# Patient Record
Sex: FEMALE | URBAN - NONMETROPOLITAN AREA
[De-identification: names, ages, dates, MRNs, and addresses within clinical notes are randomized per-mention and may not be internally consistent; named-entity substitution may affect disease eponyms.]

---

## 2018-03-19 ENCOUNTER — APPOINTMENT (OUTPATIENT)
Age: 73
Setting detail: DERMATOLOGY
End: 2018-03-19

## 2018-03-19 DIAGNOSIS — L21.8 OTHER SEBORRHEIC DERMATITIS: ICD-10-CM

## 2018-03-19 DIAGNOSIS — L57.0 ACTINIC KERATOSIS: ICD-10-CM

## 2018-03-19 DIAGNOSIS — L64.8 OTHER ANDROGENIC ALOPECIA: ICD-10-CM

## 2018-03-19 PROBLEM — D48.5 NEOPLASM OF UNCERTAIN BEHAVIOR OF SKIN: Status: ACTIVE | Noted: 2018-03-19

## 2018-03-19 PROCEDURE — 11100: CPT

## 2018-03-19 PROCEDURE — OTHER BIOPSY BY PUNCH METHOD: OTHER

## 2018-03-19 PROCEDURE — OTHER PRESCRIPTION: OTHER

## 2018-03-19 PROCEDURE — OTHER COUNSELING: OTHER

## 2018-03-19 PROCEDURE — 99202 OFFICE O/P NEW SF 15 MIN: CPT | Mod: 25

## 2018-03-19 PROCEDURE — OTHER MIPS QUALITY: OTHER

## 2018-03-19 RX ORDER — CLOBETASOL PROPIONATE 0.5 MG/ML
SOLUTION TOPICAL
Qty: 1 | Refills: 1 | Status: ERX | COMMUNITY
Start: 2018-03-19

## 2018-03-19 RX ORDER — KETOCONAZOLE 20.5 MG/ML
SHAMPOO, SUSPENSION TOPICAL QD
Qty: 1 | Refills: 2 | Status: ERX | COMMUNITY
Start: 2018-03-19

## 2018-03-19 ASSESSMENT — LOCATION ZONE DERM: LOCATION ZONE: HAND

## 2018-03-19 ASSESSMENT — LOCATION DETAILED DESCRIPTION DERM: LOCATION DETAILED: RIGHT RADIAL DORSAL HAND

## 2018-03-19 ASSESSMENT — LOCATION SIMPLE DESCRIPTION DERM: LOCATION SIMPLE: RIGHT HAND

## 2018-03-19 NOTE — PROCEDURE: BIOPSY BY PUNCH METHOD
Bill For Surgical Tray: no
Detail Level: Detailed
Additional Anesthesia Volume In Cc (Will Not Render If 0): 0
Anesthesia Volume In Cc (Will Not Render If 0): 0.5
Notification Instructions: Patient will be notified of biopsy results. However, patient instructed to call the office if not contacted within 2 weeks.
Wound Care: Bacitracin
Post-Care Instructions: I reviewed with the patient in detail post-care instructions. Patient is to keep the biopsy site dry overnight, and then apply bacitracin twice daily until healed. Patient may apply hydrogen peroxide soaks to remove any crusting.
Dressing: bandage
Anesthesia Type: 1% lidocaine with 1:100,000 epinephrine
Punch Size In Mm: 3
Billing Type: Third-Party Bill
Suture Removal: 14 days
Biopsy Type: H and E
Consent: Written consent was obtained and risks were reviewed including but not limited to scarring, infection, bleeding, scabbing, incomplete removal, nerve damage and allergy to anesthesia.
Home Suture Removal Text: Patient was provided a home suture removal kit and will remove their sutures at home.  If they have any questions or difficulties they will call the office.
Hemostasis: None
Epidermal Sutures: 4-0 Nylon

## 2018-04-02 ENCOUNTER — APPOINTMENT (OUTPATIENT)
Age: 73
Setting detail: DERMATOLOGY
End: 2018-04-02

## 2018-04-02 PROBLEM — C44.622 SQUAMOUS CELL CARCINOMA OF SKIN OF RIGHT UPPER LIMB, INCLUDING SHOULDER: Status: ACTIVE | Noted: 2018-04-02

## 2018-04-02 PROCEDURE — 17311 MOHS 1 STAGE H/N/HF/G: CPT

## 2018-04-02 PROCEDURE — OTHER MOHS SURGERY: OTHER

## 2018-04-03 ENCOUNTER — APPOINTMENT (OUTPATIENT)
Age: 73
Setting detail: DERMATOLOGY
End: 2018-04-03

## 2018-04-03 PROBLEM — C44.622 SQUAMOUS CELL CARCINOMA OF SKIN OF RIGHT UPPER LIMB, INCLUDING SHOULDER: Status: ACTIVE | Noted: 2018-04-03

## 2018-04-03 PROCEDURE — OTHER REPAIR NOTE: OTHER

## 2018-04-03 PROCEDURE — 13132 CMPLX RPR F/C/C/M/N/AX/G/H/F: CPT

## 2018-04-10 ENCOUNTER — APPOINTMENT (OUTPATIENT)
Age: 73
Setting detail: DERMATOLOGY
End: 2018-04-10

## 2018-04-10 DIAGNOSIS — Z48.02 ENCOUNTER FOR REMOVAL OF SUTURES: ICD-10-CM

## 2018-04-10 PROCEDURE — 99024 POSTOP FOLLOW-UP VISIT: CPT

## 2018-04-10 PROCEDURE — OTHER SUTURE REMOVAL (GLOBAL PERIOD): OTHER

## 2018-04-10 ASSESSMENT — LOCATION ZONE DERM: LOCATION ZONE: HAND

## 2018-04-10 ASSESSMENT — LOCATION SIMPLE DESCRIPTION DERM: LOCATION SIMPLE: RIGHT HAND

## 2018-04-10 ASSESSMENT — LOCATION DETAILED DESCRIPTION DERM: LOCATION DETAILED: RIGHT RADIAL DORSAL HAND

## 2018-04-10 NOTE — PROCEDURE: SUTURE REMOVAL (GLOBAL PERIOD)
Add 39988 Cpt? (Important Note: In 2017 The Use Of 95482 Is Being Tracked By Cms To Determine Future Global Period Reimbursement For Global Periods): yes
Detail Level: Detailed

## 2018-07-09 ENCOUNTER — APPOINTMENT (OUTPATIENT)
Age: 73
Setting detail: DERMATOLOGY
End: 2018-07-09

## 2018-07-09 DIAGNOSIS — L40.8 OTHER PSORIASIS: ICD-10-CM

## 2018-07-09 PROCEDURE — OTHER PRESCRIPTION: OTHER

## 2018-07-09 PROCEDURE — 99213 OFFICE O/P EST LOW 20 MIN: CPT

## 2018-07-09 PROCEDURE — OTHER COUNSELING: OTHER

## 2018-07-09 RX ORDER — HYDROXYZINE HYDROCHLORIDE 25 MG/1
25MG TABLET, FILM COATED ORAL BID
Qty: 60 | Refills: 1 | Status: ERX | COMMUNITY
Start: 2018-07-09

## 2018-07-09 RX ORDER — TRIAMCINOLONE ACETONIDE 1 MG/G
CREAM TOPICAL BID
Qty: 1 | Refills: 1 | Status: ERX | COMMUNITY
Start: 2018-07-09

## 2018-07-09 RX ORDER — CALCIPOTRIENE 50 UG/G
CREAM TOPICAL BID
Qty: 1 | Refills: 1 | Status: ERX | COMMUNITY
Start: 2018-07-09

## 2018-07-09 ASSESSMENT — LOCATION DETAILED DESCRIPTION DERM
LOCATION DETAILED: RIGHT ANTERIOR MEDIAL PROXIMAL UPPER ARM
LOCATION DETAILED: LEFT LATERAL ABDOMEN
LOCATION DETAILED: RIGHT LATERAL ABDOMEN
LOCATION DETAILED: LEFT AXILLARY TAIL OF BREAST

## 2018-07-09 ASSESSMENT — LOCATION ZONE DERM
LOCATION ZONE: ARM
LOCATION ZONE: TRUNK

## 2018-07-09 ASSESSMENT — LOCATION SIMPLE DESCRIPTION DERM
LOCATION SIMPLE: ABDOMEN
LOCATION SIMPLE: RIGHT UPPER ARM
LOCATION SIMPLE: LEFT BREAST

## 2019-10-02 NOTE — PROCEDURE: REPAIR NOTE
Additional Anesthesia Volume In Cc (Will Not Render If 0): 0 Referred To Otolaryngology For Closure Text (Leave Blank If You Do Not Want): After obtaining clear surgical margins the patient was sent to otolaryngology for surgical repair.  The patient understands they will receive post-surgical care and follow-up from the referring physician's office.

## 2020-06-15 ENCOUNTER — APPOINTMENT (OUTPATIENT)
Age: 75
Setting detail: DERMATOLOGY
End: 2020-06-25

## 2020-06-15 DIAGNOSIS — L57.0 ACTINIC KERATOSIS: ICD-10-CM

## 2020-06-15 DIAGNOSIS — L81.4 OTHER MELANIN HYPERPIGMENTATION: ICD-10-CM

## 2020-06-15 DIAGNOSIS — G89.3 NEOPLASM RELATED PAIN (ACUTE) (CHRONIC): ICD-10-CM

## 2020-06-15 PROBLEM — D48.5 NEOPLASM OF UNCERTAIN BEHAVIOR OF SKIN: Status: ACTIVE | Noted: 2020-06-15

## 2020-06-15 PROCEDURE — OTHER BIOPSY BY SHAVE METHOD: OTHER

## 2020-06-15 PROCEDURE — OTHER MIPS QUALITY: OTHER

## 2020-06-15 PROCEDURE — OTHER REASSURANCE: OTHER

## 2020-06-15 PROCEDURE — 99213 OFFICE O/P EST LOW 20 MIN: CPT | Mod: 25

## 2020-06-15 PROCEDURE — OTHER PRESCRIPTION: OTHER

## 2020-06-15 PROCEDURE — OTHER COUNSELING: OTHER

## 2020-06-15 PROCEDURE — 69100 BIOPSY OF EXTERNAL EAR: CPT

## 2020-06-15 RX ORDER — MUPIROCIN 20 MG/G
2% OINTMENT TOPICAL BID
Qty: 1 | Refills: 0 | Status: ERX | COMMUNITY
Start: 2020-06-15

## 2020-06-15 ASSESSMENT — LOCATION ZONE DERM
LOCATION ZONE: ARM
LOCATION ZONE: EAR

## 2020-06-15 ASSESSMENT — LOCATION DETAILED DESCRIPTION DERM
LOCATION DETAILED: LEFT DISTAL DORSAL FOREARM
LOCATION DETAILED: LEFT SUPERIOR HELIX
LOCATION DETAILED: LEFT TRIANGULAR FOSSA
LOCATION DETAILED: RIGHT PROXIMAL DORSAL FOREARM

## 2020-06-15 ASSESSMENT — LOCATION SIMPLE DESCRIPTION DERM
LOCATION SIMPLE: LEFT EAR
LOCATION SIMPLE: LEFT FOREARM
LOCATION SIMPLE: RIGHT FOREARM

## 2020-06-15 NOTE — PROCEDURE: BIOPSY BY SHAVE METHOD
Type Of Destruction Used: Curettage
Hide Additional Anticipated Plan?: No
Wound Care: Petrolatum
Information: Selecting Yes will display possible errors in your note based on the variables you have selected. This validation is only offered as a suggestion for you. PLEASE NOTE THAT THE VALIDATION TEXT WILL BE REMOVED WHEN YOU FINALIZE YOUR NOTE. IF YOU WANT TO FAX A PRELIMINARY NOTE YOU WILL NEED TO TOGGLE THIS TO 'NO' IF YOU DO NOT WANT IT IN YOUR FAXED NOTE.
Was A Bandage Applied: Yes
Electrodesiccation Text: The wound bed was treated with electrodesiccation after the biopsy was performed.
X Size Of Lesion In Cm: 0
Billing Type: Third-Party Bill
Cryotherapy Text: The wound bed was treated with cryotherapy after the biopsy was performed.
Post-Care Instructions: I reviewed with the patient in detail post-care instructions. Patient is to keep the biopsy site dry overnight, and then apply bacitracin twice daily until healed. Patient may apply hydrogen peroxide soaks to remove any crusting.
Curettage Text: The wound bed was treated with curettage after the biopsy was performed.
Detail Level: Detailed
Silver Nitrate Text: The wound bed was treated with silver nitrate after the biopsy was performed.
Biopsy Method: Dermablade
Electrodesiccation And Curettage Text: The wound bed was treated with electrodesiccation and curettage after the biopsy was performed.
Biopsy Type: H and E
Depth Of Biopsy: dermis
Dressing: bandage
Consent: Written consent was obtained and risks were reviewed including but not limited to scarring, infection, bleeding, scabbing, incomplete removal, nerve damage and allergy to anesthesia.
Notification Instructions: Patient will be notified of biopsy results. However, patient instructed to call the office if not contacted within 2 weeks.
Anesthesia Volume In Cc (Will Not Render If 0): 0.5
Hemostasis: Pressure
Anesthesia Type: 1% lidocaine with epinephrine

## 2020-07-02 ENCOUNTER — APPOINTMENT (OUTPATIENT)
Age: 75
Setting detail: DERMATOLOGY
End: 2020-07-06

## 2020-07-02 DIAGNOSIS — Z48.817 ENCOUNTER FOR SURGICAL AFTERCARE FOLLOWING SURGERY ON THE SKIN AND SUBCUTANEOUS TISSUE: ICD-10-CM

## 2020-07-02 DIAGNOSIS — L0391 CELLULITIS AND ABSCESS OF UNSPECIFIED SITES: ICD-10-CM

## 2020-07-02 DIAGNOSIS — L0390 CELLULITIS AND ABSCESS OF UNSPECIFIED SITES: ICD-10-CM

## 2020-07-02 PROBLEM — H60.12 CELLULITIS OF LEFT EXTERNAL EAR: Status: ACTIVE | Noted: 2020-07-02

## 2020-07-02 PROCEDURE — OTHER PRESCRIPTION: OTHER

## 2020-07-02 PROCEDURE — 99024 POSTOP FOLLOW-UP VISIT: CPT

## 2020-07-02 PROCEDURE — 99213 OFFICE O/P EST LOW 20 MIN: CPT

## 2020-07-02 PROCEDURE — OTHER POST-OP WOUND CHECK: OTHER

## 2020-07-02 PROCEDURE — OTHER COUNSELING: OTHER

## 2020-07-02 RX ORDER — CEPHALEXIN 500 MG/1
500 CAPSULE ORAL BID
Qty: 20 | Refills: 0 | Status: ERX | COMMUNITY
Start: 2020-07-02

## 2020-07-02 ASSESSMENT — LOCATION SIMPLE DESCRIPTION DERM: LOCATION SIMPLE: LEFT EAR

## 2020-07-02 ASSESSMENT — LOCATION DETAILED DESCRIPTION DERM
LOCATION DETAILED: LEFT SUPERIOR HELIX
LOCATION DETAILED: LEFT SUPERIOR CRUS OF ANTIHELIX

## 2020-07-02 ASSESSMENT — LOCATION ZONE DERM: LOCATION ZONE: EAR

## 2020-07-02 NOTE — PROCEDURE: POST-OP WOUND CHECK
Detail Level: Detailed
Add 73353 Cpt? (Important Note: In 2017 The Use Of 71023 Is Being Tracked By Cms To Determine Future Global Period Reimbursement For Global Periods): yes

## 2020-07-16 ENCOUNTER — APPOINTMENT (OUTPATIENT)
Age: 75
Setting detail: DERMATOLOGY
End: 2020-07-17

## 2020-07-16 DIAGNOSIS — L98429 CHRONIC ULCER OF OTHER SPECIFIED SITES: ICD-10-CM

## 2020-07-16 DIAGNOSIS — L0391 CELLULITIS AND ABSCESS OF UNSPECIFIED SITES: ICD-10-CM

## 2020-07-16 DIAGNOSIS — L98419 CHRONIC ULCER OF OTHER SPECIFIED SITES: ICD-10-CM

## 2020-07-16 DIAGNOSIS — L0390 CELLULITIS AND ABSCESS OF UNSPECIFIED SITES: ICD-10-CM

## 2020-07-16 PROBLEM — H60.12 CELLULITIS OF LEFT EXTERNAL EAR: Status: ACTIVE | Noted: 2020-07-16

## 2020-07-16 PROBLEM — L98.499 NON-PRESSURE CHRONIC ULCER OF SKIN OF OTHER SITES WITH UNSPECIFIED SEVERITY: Status: ACTIVE | Noted: 2020-07-16

## 2020-07-16 PROCEDURE — OTHER PRESCRIPTION: OTHER

## 2020-07-16 PROCEDURE — 99213 OFFICE O/P EST LOW 20 MIN: CPT

## 2020-07-16 PROCEDURE — OTHER COUNSELING: OTHER

## 2020-07-16 RX ORDER — CLINDAMYCIN HYDROCHLORIDE 300 MG/1
CAPSULE ORAL
Qty: 30 | Refills: 0 | Status: ERX | COMMUNITY
Start: 2020-07-16

## 2020-07-16 RX ORDER — CLOBETASOL PROPIONATE 0.5 MG/G
OINTMENT TOPICAL
Qty: 1 | Refills: 0 | Status: ERX | COMMUNITY
Start: 2020-07-16

## 2020-07-16 ASSESSMENT — LOCATION DETAILED DESCRIPTION DERM
LOCATION DETAILED: LEFT SUPERIOR HELIX
LOCATION DETAILED: LEFT SUPERIOR CRUS OF ANTIHELIX

## 2020-07-16 ASSESSMENT — LOCATION ZONE DERM: LOCATION ZONE: EAR

## 2020-07-16 ASSESSMENT — LOCATION SIMPLE DESCRIPTION DERM: LOCATION SIMPLE: LEFT EAR

## 2022-08-19 NOTE — PROCEDURE: REPAIR NOTE
Products Recommended: Sun Protective Clothing.    Full spectrum. Sunscreen General Sunscreen Counseling: I recommended a broad spectrum sunscreen with a SPF of 30 or higher.  I explained that SPF 30 sunscreens block approximately 97 percent of the sun's harmful rays.  Sunscreens should be applied at least 15 minutes prior to expected sun exposure and then every 2 hours after that as long as sun exposure continues. If swimming or exercising sunscreen should be reapplied every 45 minutes to an hour after getting wet or sweating.  One ounce, or the equivalent of a shot glass full of sunscreen, is adequate to protect the skin not covered by a bathing suit. I also recommended a lip balm with a sunscreen as well. Sun protective clothing can be used in lieu of sunscreen but must be worn the entire time you are exposed to the sun's rays. Detail Level: Generalized V-Y Plasty Text: The defect edges were debeveled with a #15 scalpel blade.  Given the location of the defect, shape of the defect and the proximity to free margins an V-Y advancement flap was deemed most appropriate.  Using a sterile surgical marker, an appropriate advancement flap was drawn incorporating the defect and placing the expected incisions within the relaxed skin tension lines where possible.    The area thus outlined was incised deep to adipose tissue with a #15 scalpel blade.  The skin margins were undermined to an appropriate distance in all directions utilizing iris scissors.

## 2023-08-21 ENCOUNTER — APPOINTMENT (OUTPATIENT)
Dept: URBAN - NONMETROPOLITAN AREA SURGERY 9 | Age: 78
Setting detail: DERMATOLOGY
End: 2023-08-21

## 2023-08-21 DIAGNOSIS — L82.1 OTHER SEBORRHEIC KERATOSIS: ICD-10-CM

## 2023-08-21 DIAGNOSIS — D49.2 NEOPLASM OF UNSPECIFIED BEHAVIOR OF BONE, SOFT TISSUE, AND SKIN: ICD-10-CM

## 2023-08-21 PROCEDURE — OTHER BIOPSY BY SHAVE METHOD: OTHER

## 2023-08-21 PROCEDURE — 11102 TANGNTL BX SKIN SINGLE LES: CPT

## 2023-08-21 PROCEDURE — 99212 OFFICE O/P EST SF 10 MIN: CPT | Mod: 25

## 2023-08-21 PROCEDURE — OTHER COUNSELING: OTHER

## 2023-08-21 PROCEDURE — OTHER REASSURANCE: OTHER

## 2023-08-21 ASSESSMENT — LOCATION SIMPLE DESCRIPTION DERM
LOCATION SIMPLE: RIGHT UPPER BACK
LOCATION SIMPLE: LEFT UPPER BACK
LOCATION SIMPLE: LEFT LOWER BACK

## 2023-08-21 ASSESSMENT — LOCATION ZONE DERM: LOCATION ZONE: TRUNK

## 2023-08-21 ASSESSMENT — LOCATION DETAILED DESCRIPTION DERM
LOCATION DETAILED: RIGHT SUPERIOR UPPER BACK
LOCATION DETAILED: LEFT SUPERIOR MEDIAL MIDBACK
LOCATION DETAILED: LEFT MID-UPPER BACK
LOCATION DETAILED: RIGHT INFERIOR UPPER BACK

## 2023-09-20 ENCOUNTER — APPOINTMENT (OUTPATIENT)
Dept: URBAN - NONMETROPOLITAN AREA SURGERY 9 | Age: 78
Setting detail: DERMATOLOGY
End: 2023-09-21

## 2023-09-20 DIAGNOSIS — L82.1 OTHER SEBORRHEIC KERATOSIS: ICD-10-CM

## 2023-09-20 DIAGNOSIS — Z48.817 ENCOUNTER FOR SURGICAL AFTERCARE FOLLOWING SURGERY ON THE SKIN AND SUBCUTANEOUS TISSUE: ICD-10-CM

## 2023-09-20 PROBLEM — C44.519 BASAL CELL CARCINOMA OF SKIN OF OTHER PART OF TRUNK: Status: ACTIVE | Noted: 2023-09-20

## 2023-09-20 PROCEDURE — OTHER PATHOLOGY DISCUSSION: OTHER

## 2023-09-20 PROCEDURE — OTHER CONSULTATION FOR MOHS SURGERY: OTHER

## 2023-09-20 PROCEDURE — 99024 POSTOP FOLLOW-UP VISIT: CPT

## 2023-09-20 PROCEDURE — OTHER POST-OP WOUND CHECK: OTHER

## 2023-09-20 PROCEDURE — OTHER COUNSELING: OTHER

## 2023-09-20 PROCEDURE — OTHER MIPS QUALITY: OTHER

## 2023-09-20 PROCEDURE — OTHER REASSURANCE: OTHER

## 2023-09-20 ASSESSMENT — LOCATION ZONE DERM: LOCATION ZONE: TRUNK

## 2023-09-20 ASSESSMENT — LOCATION DETAILED DESCRIPTION DERM
LOCATION DETAILED: RIGHT INFERIOR UPPER BACK
LOCATION DETAILED: LEFT MID-UPPER BACK
LOCATION DETAILED: RIGHT SUPERIOR UPPER BACK
LOCATION DETAILED: LEFT SUPERIOR MEDIAL MIDBACK

## 2023-09-20 ASSESSMENT — LOCATION SIMPLE DESCRIPTION DERM
LOCATION SIMPLE: LEFT LOWER BACK
LOCATION SIMPLE: LEFT UPPER BACK
LOCATION SIMPLE: RIGHT UPPER BACK

## 2023-09-20 NOTE — PROCEDURE: POST-OP WOUND CHECK
Detail Level: Detailed
Add 39958 Cpt? (Important Note: In 2017 The Use Of 01020 Is Being Tracked By Cms To Determine Future Global Period Reimbursement For Global Periods): yes

## 2023-09-26 ENCOUNTER — APPOINTMENT (OUTPATIENT)
Dept: URBAN - NONMETROPOLITAN AREA SURGERY 9 | Age: 78
Setting detail: DERMATOLOGY
End: 2023-09-26

## 2023-09-26 PROBLEM — C44.519 BASAL CELL CARCINOMA OF SKIN OF OTHER PART OF TRUNK: Status: ACTIVE | Noted: 2023-09-26

## 2023-09-26 PROCEDURE — 17313 MOHS 1 STAGE T/A/L: CPT

## 2023-09-26 PROCEDURE — OTHER PRESCRIPTION: OTHER

## 2023-09-26 PROCEDURE — OTHER COUNSELING: OTHER

## 2023-09-26 PROCEDURE — OTHER MOHS SURGERY: OTHER

## 2023-09-26 PROCEDURE — 13101 CMPLX RPR TRUNK 2.6-7.5 CM: CPT

## 2023-09-26 PROCEDURE — OTHER REPAIR NOTE: OTHER

## 2023-09-26 RX ORDER — MUPIROCIN 20 MG/G
OINTMENT TOPICAL BID
Qty: 22 | Refills: 0 | Status: ERX | COMMUNITY
Start: 2023-09-26

## 2023-09-26 RX ORDER — CEPHALEXIN 500 MG/1
CAPSULE ORAL BID
Qty: 20 | Refills: 0 | Status: ERX | COMMUNITY
Start: 2023-09-26

## 2023-10-09 ENCOUNTER — APPOINTMENT (OUTPATIENT)
Dept: URBAN - NONMETROPOLITAN AREA SURGERY 9 | Age: 78
Setting detail: DERMATOLOGY
End: 2023-10-10

## 2023-10-09 DIAGNOSIS — Z48.02 ENCOUNTER FOR REMOVAL OF SUTURES: ICD-10-CM

## 2023-10-09 DIAGNOSIS — Z85.828 PERSONAL HISTORY OF OTHER MALIGNANT NEOPLASM OF SKIN: ICD-10-CM

## 2023-10-09 PROCEDURE — 99024 POSTOP FOLLOW-UP VISIT: CPT

## 2023-10-09 PROCEDURE — OTHER SUTURE REMOVAL (GLOBAL PERIOD): OTHER

## 2023-10-09 PROCEDURE — OTHER COUNSELING: OTHER

## 2023-10-09 ASSESSMENT — LOCATION ZONE DERM: LOCATION ZONE: TRUNK

## 2023-10-09 ASSESSMENT — LOCATION DETAILED DESCRIPTION DERM
LOCATION DETAILED: RIGHT SUPERIOR UPPER BACK
LOCATION DETAILED: RIGHT SUPERIOR MEDIAL UPPER BACK

## 2023-10-09 ASSESSMENT — LOCATION SIMPLE DESCRIPTION DERM: LOCATION SIMPLE: RIGHT UPPER BACK

## 2023-10-09 NOTE — PROCEDURE: SUTURE REMOVAL (GLOBAL PERIOD)
Detail Level: Detailed
Add 43139 Cpt? (Important Note: In 2017 The Use Of 76419 Is Being Tracked By Cms To Determine Future Global Period Reimbursement For Global Periods): yes

## 2023-11-30 ENCOUNTER — APPOINTMENT (OUTPATIENT)
Dept: URBAN - NONMETROPOLITAN AREA SURGERY 9 | Age: 78
Setting detail: DERMATOLOGY
End: 2023-11-30

## 2023-11-30 DIAGNOSIS — D49.2 NEOPLASM OF UNSPECIFIED BEHAVIOR OF BONE, SOFT TISSUE, AND SKIN: ICD-10-CM

## 2023-11-30 DIAGNOSIS — Z85.828 PERSONAL HISTORY OF OTHER MALIGNANT NEOPLASM OF SKIN: ICD-10-CM

## 2023-11-30 PROCEDURE — OTHER MIPS QUALITY: OTHER

## 2023-11-30 PROCEDURE — 69100 BIOPSY OF EXTERNAL EAR: CPT | Mod: 76

## 2023-11-30 PROCEDURE — OTHER BIOPSY BY SHAVE METHOD: OTHER

## 2023-11-30 PROCEDURE — OTHER COUNSELING: OTHER

## 2023-11-30 PROCEDURE — 99212 OFFICE O/P EST SF 10 MIN: CPT | Mod: 25

## 2023-11-30 PROCEDURE — 69100 BIOPSY OF EXTERNAL EAR: CPT

## 2023-11-30 ASSESSMENT — LOCATION ZONE DERM
LOCATION ZONE: EAR
LOCATION ZONE: TRUNK

## 2023-11-30 ASSESSMENT — LOCATION DETAILED DESCRIPTION DERM
LOCATION DETAILED: RIGHT MID-UPPER BACK
LOCATION DETAILED: RIGHT SUPERIOR HELIX

## 2023-11-30 ASSESSMENT — LOCATION SIMPLE DESCRIPTION DERM
LOCATION SIMPLE: RIGHT EAR
LOCATION SIMPLE: RIGHT UPPER BACK

## 2023-11-30 NOTE — PROCEDURE: BIOPSY BY SHAVE METHOD
Detail Level: Detailed
Depth Of Biopsy: dermis
Was A Bandage Applied: Yes
Size Of Lesion In Cm: 0
Biopsy Type: H and E
Biopsy Method: Dermablade
Anesthesia Type: 1% lidocaine with epinephrine
Anesthesia Volume In Cc: 0.5
Hemostasis: Drysol
Wound Care: Petrolatum
Dressing: bandage
Destruction After The Procedure: No
Type Of Destruction Used: Curettage
Curettage Text: The wound bed was treated with curettage after the biopsy was performed.
Cryotherapy Text: The wound bed was treated with cryotherapy after the biopsy was performed.
Electrodesiccation Text: The wound bed was treated with electrodesiccation after the biopsy was performed.
Electrodesiccation And Curettage Text: The wound bed was treated with electrodesiccation and curettage after the biopsy was performed.
Silver Nitrate Text: The wound bed was treated with silver nitrate after the biopsy was performed.
Lab: [id number]
Consent: Written consent was obtained and risks were reviewed including but not limited to scarring, infection, bleeding, scabbing, incomplete removal, nerve damage and allergy to anesthesia.
Post-Care Instructions: I reviewed with the patient in detail post-care instructions. Patient is to keep the biopsy site dry overnight, and then apply bacitracin twice daily until healed. Patient may apply hydrogen peroxide soaks to remove any crusting.
Notification Instructions: Patient will be notified of biopsy results. However, patient instructed to call the office if not contacted within 2 weeks.
Billing Type: Third-Party Bill
Information: Selecting Yes will display possible errors in your note based on the variables you have selected. This validation is only offered as a suggestion for you. PLEASE NOTE THAT THE VALIDATION TEXT WILL BE REMOVED WHEN YOU FINALIZE YOUR NOTE. IF YOU WANT TO FAX A PRELIMINARY NOTE YOU WILL NEED TO TOGGLE THIS TO 'NO' IF YOU DO NOT WANT IT IN YOUR FAXED NOTE.

## 2024-02-14 ENCOUNTER — APPOINTMENT (OUTPATIENT)
Dept: URBAN - NONMETROPOLITAN AREA SURGERY 9 | Age: 79
Setting detail: DERMATOLOGY
End: 2024-02-14

## 2024-02-14 DIAGNOSIS — L57.0 ACTINIC KERATOSIS: ICD-10-CM

## 2024-02-14 DIAGNOSIS — Z85.828 PERSONAL HISTORY OF OTHER MALIGNANT NEOPLASM OF SKIN: ICD-10-CM

## 2024-02-14 PROCEDURE — 99212 OFFICE O/P EST SF 10 MIN: CPT | Mod: 25

## 2024-02-14 PROCEDURE — OTHER LIQUID NITROGEN: OTHER

## 2024-02-14 PROCEDURE — 17000 DESTRUCT PREMALG LESION: CPT

## 2024-02-14 PROCEDURE — OTHER COUNSELING: OTHER

## 2024-02-14 ASSESSMENT — LOCATION SIMPLE DESCRIPTION DERM
LOCATION SIMPLE: RIGHT EAR
LOCATION SIMPLE: RIGHT UPPER BACK

## 2024-02-14 ASSESSMENT — LOCATION DETAILED DESCRIPTION DERM
LOCATION DETAILED: RIGHT SUPERIOR HELIX
LOCATION DETAILED: RIGHT MID-UPPER BACK

## 2024-02-14 ASSESSMENT — LOCATION ZONE DERM
LOCATION ZONE: EAR
LOCATION ZONE: TRUNK

## 2025-02-18 NOTE — PROCEDURE: REPAIR NOTE
Routine Prenatal Visit  [facility] [place]  [address], [place] [address]  Assessment & Plan  Abnormal glucose tolerance test (GTT) during pregnancy, antepartum  - Completed home BG monitoring, did not meet criteria for GDM       Prenatal care, subsequent pregnancy, third trimester         37 weeks gestation of pregnancy  - Labs up to date   - Genetic testing deferred   - Unremarkable level II US   - Vaccinations: declined previously   - GBS negative   - Desires SVE today, 1.5-50/-3; fetus vertex on US (patient desired a check)   - RTC for 38 week visit     Orders:    POCT urine dip    Other obesity due to excess calories affecting pregnancy in first trimester  - BMI 33        Subjective:   Greer Lu is a 32 y.o.  who presents for routine prenatal care at 37w5d.  Complaints today: reports irregular contractions     LOF: no; VB: no; Contractions: irregular; FM: yes     Objective:  BP 96/60   Wt 87.8 kg (193 lb 9.6 oz)   LMP 2024 (Exact Date)   BMI 33.23 kg/m²     General: Well appearing, no distress  Respiratory: Unlabored breathing  Cardiovascular: Regular rate.  Abdomen: Soft, gravid, nontender  Extremities: Warm and well perfused.  Non tender.    Pregravid Weight/BMI: 78 kg (172 lb) (BMI 29.51)  Current Weight: 87.8 kg (193 lb 9.6 oz)   Total Weight Gain: 9.798 kg (21 lb 9.6 oz)     Pre-Renuka Vitals      Flowsheet Row Most Recent Value   Prenatal Assessment    Fetal Heart Rate 145   Fundal Height (cm) 37 cm   Movement Present   Prenatal Vitals    Blood Pressure 96/60   Weight - Scale 87.8 kg (193 lb 9.6 oz)   Urine Albumin/Glucose    Dilation/Effacement/Station    Vaginal Drainage    Edema            Leobardo Castillo MD  2025 9:38 AM     Composite Graft Text: The defect edges were debeveled with a #15 scalpel blade.  Given the location of the defect, shape of the defect, the proximity to free margins and the fact the defect was full thickness a composite graft was deemed most appropriate.  The defect was outline and then transferred to the donor site.  A full thickness graft was then excised from the donor site. The graft was then placed in the primary defect, oriented appropriately and then sutured into place.  The secondary defect was then repaired using a primary closure.

## 2025-03-13 NOTE — PROCEDURE: MOHS SURGERY
What Type Of Note Output Would You Prefer (Optional)?: Bullet Format How Severe Is Your Skin Lesion?: mild Has Your Skin Lesion Been Treated?: not been treated Is This A New Presentation, Or A Follow-Up?: Skin Lesion V-Y Flap Text: The defect edges were debeveled with a #15 scalpel blade. Given the location of the defect, shape of the defect and the proximity to free margins a V-Y flap was deemed most appropriate. Using a sterile surgical marker, an appropriate advancement flap was drawn incorporating the defect and placing the expected incisions within the relaxed skin tension lines where possible. The area thus outlined was incised deep to adipose tissue with a #15 scalpel blade. The skin margins were undermined to an appropriate distance in all directions utilizing iris scissors. Following this, the designed flap was advanced and carried over into the primary defect and sutured into place.